# Patient Record
Sex: MALE | Race: WHITE
[De-identification: names, ages, dates, MRNs, and addresses within clinical notes are randomized per-mention and may not be internally consistent; named-entity substitution may affect disease eponyms.]

---

## 2018-03-30 ENCOUNTER — HOSPITAL ENCOUNTER (EMERGENCY)
Dept: HOSPITAL 12 - ER | Age: 23
LOS: 1 days | Discharge: HOME | End: 2018-03-31
Payer: COMMERCIAL

## 2018-03-30 VITALS — WEIGHT: 122 LBS | HEIGHT: 66 IN | BODY MASS INDEX: 19.61 KG/M2

## 2018-03-30 DIAGNOSIS — R10.9: Primary | ICD-10-CM

## 2018-03-30 DIAGNOSIS — F12.90: ICD-10-CM

## 2018-03-30 PROCEDURE — A4663 DIALYSIS BLOOD PRESSURE CUFF: HCPCS

## 2018-03-31 VITALS — DIASTOLIC BLOOD PRESSURE: 57 MMHG | SYSTOLIC BLOOD PRESSURE: 103 MMHG

## 2018-03-31 NOTE — NUR
Patient discharged to home in stable conditon.  Written and verbal after care 
instructions given. 

Patient verbalizes understanding of instructions. Patient ambulated out of ER 
with steady gait, all belongings taken, VSS, no acute signs of distress.

## 2018-03-31 NOTE — NUR
Patient ambulated to ER with steady gait, c/o abdominal pain of 2 on the upper 
middle quadrant, started around 10pm, pain was originally a 6, patient states 
"burped and the pain lowered to 2 but pain now has moved to lower abdominal 
area, closer to the groin. Patient denies nausea, vomiting, or diarrhea.